# Patient Record
Sex: FEMALE | Race: OTHER | Employment: OTHER | URBAN - METROPOLITAN AREA
[De-identification: names, ages, dates, MRNs, and addresses within clinical notes are randomized per-mention and may not be internally consistent; named-entity substitution may affect disease eponyms.]

---

## 2017-05-02 NOTE — PATIENT DISCUSSION
NTG, OU: INTRAOCULAR PRESSURE IS WITHIN ACCEPTABLE LIMITS BUT PATIENT IS NONCOMPLIANT WITH DROPS DUE TO BLURRED VISION AFTER DROP INSTILLATION. DUE TO HISTORY OF DROP INTOLERANCE, PATIENT ADVISED TO STOP ALL DROPS UNTIL HER NEXT APPOINTMENT WITH DR. EL.

## 2017-05-22 NOTE — PATIENT DISCUSSION
*NTG, OU: INTRAOCULAR PRESSURE IS WITHIN ACCEPTABLE LIMITS WITHOUT DROPS. PATIENT IS UNABLE TO TAKE MULTIPLE GLAUCOMA DROPS. OK TO STOP LATANOPROST. REFER TO DR. Jean-Claude Cedillo FOR EVAULATION. IF OPTIC NERVE OR VISUAL FIELD CHANGES OCCUR, CONSIDER SLT OU. RETURN FOR FOLLOW-UP AS SCHEDULED.

## 2017-05-22 NOTE — PATIENT DISCUSSION
OCULAR ROSACEA, OU:  PRESCRIBE WARM COMPRESSES,  EYELID SCRUBS QD-BID, AND GOOD QUALITY ARTIFICIAL TEARS BID-QID. RECOMMEND THE DAILY INTAKE OF OMEGA-3 FATTY ACIDS WITH [facility] PHYSICIANS APPROVAL. WILL CONSIDER TOPICAL STEROID, ORAL TETRACYCLINE AND/OR LIPIFLOW FOR EXACERBATIONS IF SYMPTOMS WORSEN. RETURN FOR FOLLOW-UP AS SCHEDULED.

## 2018-05-21 NOTE — PATIENT DISCUSSION
*NTG, OU: INTRAOCULAR PRESSURE IS WITHIN ACCEPTABLE LIMITS. NO NEED FOR DROPS AT THIS TIME. RETURN FOR FOLLOW-UP AS SCHEDULED. CONTINUE TO FOLLOW UP WITH DR. Jose Gross. ALTERNATING EVERY 6 MONTHS WITH DR. EL.

## 2018-12-10 NOTE — PATIENT DISCUSSION
NTG counseling: I have explained to the patient at length the diagnosis of glaucoma and its pathophysiology. The presence of undetectable visual field loss and/or optic nerve damage with normal interocular pressures was discussed with the patient. I have discussed the risks, benefits, and alternatives of treatment as well as the risk factors for glaucoma. The patient understands and agrees with treatment plan and close observation. Return for follow-up as scheduled.

## 2018-12-10 NOTE — PATIENT DISCUSSION
CATARACTS, OU - BECOMING VISUALLY SIGNIFICANT BUT NOT BOTHERSOME TO PATIENT AT THIS TIME. SPEC RX OFFERED.  FOLLOW WITH DR Priscilla Shah

## 2018-12-10 NOTE — PATIENT DISCUSSION
NTG, OU: INTRAOCULAR PRESSURE IS WITHIN ACCEPTABLE LIMITS WITHOUT DROPS. PATIENT IS UNABLE TO TAKE MULTIPLE GLAUCOMA DROPS. OK TO STAY OFF LATANOPROST. SUPERIOR CHANGES ON VF CORRELATE WITH LID, HX TMJ SURGERY OD WITH LID PTOSIS OD, NO CHANEGS ON RNFL.  FOLLOW

## 2019-06-03 NOTE — PATIENT DISCUSSION
*NTG, OU: INTRAOCULAR PRESSURE IS WITHIN ACCEPTABLE LIMITS. PATIENT UNABLE TO TAKE DROPS DUE TO SENSITIVITIES. NO NEED FOR DROPS AT THIS TIME. CONTINUE TO FOLLOW CLOSELY WITH YEARLY VISUAL [facility] AND VISITS WITH DR. Wisam Brown.

## 2019-12-16 NOTE — PATIENT DISCUSSION
OCULAR ROSACEA, OU: PRESCRIBE WARM COMPRESSES AND EYELID SCRUBS QD-BID, ARTIFICIAL TEARS BID-QID AND THE DAILY INTAKE OF OMEGA-3 FATTY ACIDS.  C

## 2019-12-16 NOTE — PATIENT DISCUSSION
NTG, OU: INTRAOCULAR PRESSURE IS WITHIN ACCEPTABLE LIMITS WITHOUT DROPS. DISCUSSED VF AND RNFL WITH PT. GOOD IOP.  FOLLOW (ALTERNATE FU EVERY 6MO WITH DR Tung Sabillon)

## 2019-12-16 NOTE — PATIENT DISCUSSION
CATARACTS, OU - BECOMING VISUALLY SIGNIFICANT BUT NOT BOTHERSOME TO PATIENT AT THIS TIME. SPEC RX OFFERED.  FOLLOW WITH DR Tung Sabillon

## 2020-06-08 NOTE — PATIENT DISCUSSION
*NTG, OU: INTRAOCULAR PRESSURE IS WITHIN ACCEPTABLE LIMITS. CONTINUE WITH DR. Clyde Paulino FOR FOLLOW-UP AS SCHEDULED.

## 2020-12-18 NOTE — PATIENT DISCUSSION
NTG, OU: INTRAOCULAR PRESSURE IS WITHIN ACCEPTABLE LIMITS WITHOUT DROPS. GOOD IOP. FOLLOW. DISCUSSED VF AND RNFL W PT.  (ALTERNATE FU EVERY 6MO WITH DR Matthew Zarco)

## 2022-02-04 ENCOUNTER — CONSULTATION/EVALUATION (OUTPATIENT)
Dept: URBAN - METROPOLITAN AREA CLINIC 43 | Facility: CLINIC | Age: 52
End: 2022-02-04

## 2022-02-04 DIAGNOSIS — H43.393: ICD-10-CM

## 2022-02-04 DIAGNOSIS — H04.123: ICD-10-CM

## 2022-02-04 DIAGNOSIS — Z79.899: ICD-10-CM

## 2022-02-04 PROCEDURE — 92250 FUNDUS PHOTOGRAPHY W/I&R: CPT

## 2022-02-04 PROCEDURE — 92273 FULL FIELD ERG W/I&R: CPT

## 2022-02-04 PROCEDURE — 92235 FLUORESCEIN ANGRPH MLTIFRAME: CPT

## 2022-02-04 PROCEDURE — 99204 OFFICE O/P NEW MOD 45 MIN: CPT

## 2022-02-04 ASSESSMENT — VISUAL ACUITY
OS_CC: 20/25+2
OD_CC: 20/20-1

## 2022-02-04 ASSESSMENT — TONOMETRY
OS_IOP_MMHG: 18
OD_IOP_MMHG: 15

## 2022-02-04 NOTE — PATIENT DISCUSSION
Moderate risk due to long-term use, Cleared to continue Plaquenil.  Check HVF 10-2, follow up in 3 months.